# Patient Record
Sex: MALE
[De-identification: names, ages, dates, MRNs, and addresses within clinical notes are randomized per-mention and may not be internally consistent; named-entity substitution may affect disease eponyms.]

---

## 2019-06-08 ENCOUNTER — HOSPITAL ENCOUNTER (EMERGENCY)
Dept: HOSPITAL 72 - EMR | Age: 44
Discharge: HOME | End: 2019-06-08
Payer: MEDICAID

## 2019-06-08 VITALS — HEIGHT: 71 IN | BODY MASS INDEX: 34.3 KG/M2 | WEIGHT: 245 LBS

## 2019-06-08 VITALS — SYSTOLIC BLOOD PRESSURE: 149 MMHG | DIASTOLIC BLOOD PRESSURE: 91 MMHG

## 2019-06-08 DIAGNOSIS — K80.20: ICD-10-CM

## 2019-06-08 DIAGNOSIS — R10.11: Primary | ICD-10-CM

## 2019-06-08 LAB
ADD MANUAL DIFF: NO
ALBUMIN SERPL-MCNC: 4 G/DL (ref 3.4–5)
ALBUMIN/GLOB SERPL: 1.1 {RATIO} (ref 1–2.7)
ALP SERPL-CCNC: 110 U/L (ref 46–116)
ALT SERPL-CCNC: 53 U/L (ref 12–78)
ANION GAP SERPL CALC-SCNC: 9 MMOL/L (ref 5–15)
APPEARANCE UR: CLEAR
APTT PPP: (no result) S
AST SERPL-CCNC: 31 U/L (ref 15–37)
BASOPHILS NFR BLD AUTO: 1.5 % (ref 0–2)
BILIRUB SERPL-MCNC: 0.3 MG/DL (ref 0.2–1)
BUN SERPL-MCNC: 11 MG/DL (ref 7–18)
CALCIUM SERPL-MCNC: 9 MG/DL (ref 8.5–10.1)
CHLORIDE SERPL-SCNC: 104 MMOL/L (ref 98–107)
CO2 SERPL-SCNC: 27 MMOL/L (ref 21–32)
CREAT SERPL-MCNC: 1 MG/DL (ref 0.55–1.3)
EOSINOPHIL NFR BLD AUTO: 4.5 % (ref 0–3)
ERYTHROCYTE [DISTWIDTH] IN BLOOD BY AUTOMATED COUNT: 11 % (ref 11.6–14.8)
GLOBULIN SER-MCNC: 3.5 G/DL
GLUCOSE UR STRIP-MCNC: NEGATIVE MG/DL
HCT VFR BLD CALC: 43.8 % (ref 42–52)
HGB BLD-MCNC: 15.3 G/DL (ref 14.2–18)
KETONES UR QL STRIP: NEGATIVE
LEUKOCYTE ESTERASE UR QL STRIP: NEGATIVE
LYMPHOCYTES NFR BLD AUTO: 38.6 % (ref 20–45)
MCV RBC AUTO: 83 FL (ref 80–99)
MONOCYTES NFR BLD AUTO: 6.3 % (ref 1–10)
NEUTROPHILS NFR BLD AUTO: 49.1 % (ref 45–75)
NITRITE UR QL STRIP: NEGATIVE
PH UR STRIP: 5 [PH] (ref 4.5–8)
PLATELET # BLD: 263 K/UL (ref 150–450)
POTASSIUM SERPL-SCNC: 3.5 MMOL/L (ref 3.5–5.1)
PROT UR QL STRIP: NEGATIVE
RBC # BLD AUTO: 5.28 M/UL (ref 4.7–6.1)
SODIUM SERPL-SCNC: 140 MMOL/L (ref 136–145)
SP GR UR STRIP: 1.02 (ref 1–1.03)
UROBILINOGEN UR-MCNC: NORMAL MG/DL (ref 0–1)
WBC # BLD AUTO: 8.5 K/UL (ref 4.8–10.8)

## 2019-06-08 PROCEDURE — 99284 EMERGENCY DEPT VISIT MOD MDM: CPT

## 2019-06-08 PROCEDURE — 80053 COMPREHEN METABOLIC PANEL: CPT

## 2019-06-08 PROCEDURE — 83690 ASSAY OF LIPASE: CPT

## 2019-06-08 PROCEDURE — 96361 HYDRATE IV INFUSION ADD-ON: CPT

## 2019-06-08 PROCEDURE — 96375 TX/PRO/DX INJ NEW DRUG ADDON: CPT

## 2019-06-08 PROCEDURE — 81003 URINALYSIS AUTO W/O SCOPE: CPT

## 2019-06-08 PROCEDURE — 74176 CT ABD & PELVIS W/O CONTRAST: CPT

## 2019-06-08 PROCEDURE — 36415 COLL VENOUS BLD VENIPUNCTURE: CPT

## 2019-06-08 PROCEDURE — 96374 THER/PROPH/DIAG INJ IV PUSH: CPT

## 2019-06-08 PROCEDURE — 85025 COMPLETE CBC W/AUTO DIFF WBC: CPT

## 2019-06-08 NOTE — DIAGNOSTIC IMAGING REPORT
EXAM:

  CT Abdomen and Pelvis Without Intravenous Contrast

 

CLINICAL HISTORY:

  ABD PAIN

 

TECHNIQUE:

  Axial computed tomography images of the abdomen and pelvis without 

intravenous contrast.  CTDI is 19.66 mGy and DLP is 1137 mGy-cm.  One or 

more of the following dose reduction techniques were used: automated 

exposure control, adjustment of the mA and/or kV according to patient 

size, use of iterative reconstruction technique.

 

COMPARISON:

  No relevant prior studies available.

 

FINDINGS:

  Lung bases:  Unremarkable.  No mass.  No consolidation.

 

 ABDOMEN:

  Liver:  Unremarkable.

  Gallbladder and bile ducts:  Unremarkable.  No calcified stones.  No 

ductal dilation.

  Pancreas:  Unremarkable.  No ductal dilation.

  Spleen:  Unremarkable.  No splenomegaly.

  Adrenals:  Unremarkable.  No mass.

  Kidneys and ureters:  Unremarkable.  No calculi.  No hydronephrosis.

  Stomach and bowel:  Unremarkable.  No obstruction.  No mucosal 

thickening.

 

 PELVIS:

  Appendix:  No findings to suggest acute appendicitis.

  Bladder:  Mild bladder wall thickening is nonspecific but can be seen 

in cystitis.  No stones.

  Reproductive:  Unremarkable as visualized.

 

 ABDOMEN and PELVIS:

  Intraperitoneal space:  Unremarkable.  No free air.  No significant 

fluid collection.

  Bones/joints:  No acute fracture.

  Soft tissues:  Unremarkable.

  Vasculature:  Unremarkable.  No abdominal aortic aneurysm.

  Lymph nodes:  Unremarkable.  No enlarged lymph nodes.

 

IMPRESSION:     

1.  No renal calculi or hydronephrosis.

2.  Mild bladder wall thickening is nonspecific but can be seen in 

cystitis.

## 2019-06-08 NOTE — EMERGENCY ROOM REPORT
History of Present Illness


General


Chief Complaint:  Abdominal Pain


Source:  Patient





Present Illness


HPI


Is a 44-year-old male with no past medical history.  He presents with chief 

complaint of right upper quadrant pain.  It was intermittent for 3 days but 

constant today.  Pain is sharp.  Radiating to the back.  Has nausea but no 

vomiting.  No diarrhea.  Pain is 8 out of 10.  Not associated with food.  

Denies any other complaint.


Allergies:  


Coded Allergies:  


     No Known Allergies (Unverified , 6/8/19)





Patient History


Past Medical History:  see triage record, old chart reviewed


Past Surgical History:  none


Pertinent Family History:  none


Social History:  Denies: smoking


Immunizations:  other


Reviewed Nursing Documentation:  PMH: Agreed; PSxH: Agreed





Nursing Documentation-PMH


Past Medical History:  No Stated History





Review of Systems


Eye:  Denies: eye pain, blurred vision


ENT:  Denies: ear pain, nose congestion, throat swelling


Respiratory:  Denies: cough, shortness of breath


Cardiovascular:  Denies: chest pain, palpitations


Gastrointestinal:  Reports: abdominal pain, nausea; Denies: diarrhea, vomiting


Musculoskeletal:  Denies: back pain, joint pain


Skin:  Denies: rash


Neurological:  Denies: headache, numbness


Endocrine:  Denies: increased thirst, increased urine


Hematologic/Lymphatic:  Denies: easy bruising


All Other Systems:  negative except mentioned in HPI





Physical Exam





Vital Signs








  Date Time  Temp Pulse Resp B/P (MAP) Pulse Ox O2 Delivery O2 Flow Rate FiO2


 


6/8/19 20:42 97.5 86 15 149/91 (110) 97 Room Air  





vitals unremarkable


Sp02 EP Interpretation:  reviewed, normal


General Appearance:  well appearing, no apparent distress, alert


Head:  normocephalic, atraumatic, other - He has sutures over the glabella.


Eyes:  bilateral eye PERRL, bilateral eye EOMI


ENT:  hearing grossly normal, normal pharynx


Neck:  full range of motion, supple, no meningismus


Respiratory:  chest non-tender, lungs clear, normal breath sounds


Cardiovascular #1:  regular rate, rhythm, no murmur


Gastrointestinal:  normal bowel sounds, non tender, no mass, no organomegaly, 

no bruit, non-distended


Musculoskeletal:  back normal, gait/station normal, normal range of motion


Psychiatric:  mood/affect normal


Skin:  warm/dry





Procedures


Additional Procedure


Procedure Narrative


Procedure: Suture removal


Indication: Laceration repair


Description: Using a tweezer and a small scissor, I removed 5 sutures.  Patient 

tolerated procedure without a problem.  No complication.  No bleeding.





Medical Decision Making


Diagnostic Impression:  


 Primary Impression:  


 Abdominal pain


 Qualified Codes:  R10.11 - Right upper quadrant pain


 Additional Impressions:  


 Cholelithiasis


 Qualified Codes:  K80.20 - Calculus of gallbladder without cholecystitis 

without obstruction


 Encounter for removal of sutures


ER Course


Patient presents with right upper quadrant pain.  CT scan is unremarkable.  No 

gallstone seen on CT scan.  I did a bedside ultrasound that showed gallbladder 

sludge.  Negative Kimbrough sign.  No gallbladder wall thickening.  Normal common 

bowel duct.  No evidence of acute abdomen or obstruction.


Lab Results Impression


labs normal





Last Vital Signs








  Date Time  Temp Pulse Resp B/P (MAP) Pulse Ox O2 Delivery O2 Flow Rate FiO2


 


6/8/19 20:42 97.5 86 15 149/91 (110) 97 Room Air  








Status:  improved


Disposition:  HOME, SELF-CARE


Condition:  Stable


Scripts


Ibuprofen* (MOTRIN*) 600 Mg Tablet


600 MG ORAL THREE TIMES A DAY, #30 TAB 0 Refills


   Prov: Konrad Bender MD         6/8/19 


Hydrocodone/Acetaminophen 5-325* (HYDROCODONE/ACETAMINOPHEN 5-325*) 1 Each 

Tablet


1 TAB ORAL Q6H PRN for For Pain, #15 TAB 0 Refills


   Prov: Konrad Bender MD         6/8/19





Additional Instructions:  


Follow-up with your doctor in 2-3 days.  Return if symptom worsen.











Konrad Bender MD Jun 8, 2019 21:11

## 2019-11-04 ENCOUNTER — HOSPITAL ENCOUNTER (EMERGENCY)
Dept: HOSPITAL 72 - EMR | Age: 44
Discharge: HOME | End: 2019-11-04
Payer: COMMERCIAL

## 2019-11-04 VITALS — HEIGHT: 71 IN | WEIGHT: 245 LBS | BODY MASS INDEX: 34.3 KG/M2

## 2019-11-04 VITALS — SYSTOLIC BLOOD PRESSURE: 148 MMHG | DIASTOLIC BLOOD PRESSURE: 83 MMHG

## 2019-11-04 VITALS — DIASTOLIC BLOOD PRESSURE: 83 MMHG | SYSTOLIC BLOOD PRESSURE: 148 MMHG

## 2019-11-04 DIAGNOSIS — R10.9: ICD-10-CM

## 2019-11-04 DIAGNOSIS — H66.91: Primary | ICD-10-CM

## 2019-11-04 PROCEDURE — 99282 EMERGENCY DEPT VISIT SF MDM: CPT

## 2019-11-04 NOTE — EMERGENCY ROOM REPORT
History of Present Illness


General


Chief Complaint:  Pain


Source:  Patient





Present Illness


HPI


44-year-old with no symptom past medical history here complaining of 2 weeks of 

pain right ear.  Patient reports that he was given amoxicillin by his primary 

care 2 weeks ago however pain still there denying any pus drainage or mastoid 

tenderness.  Denies fever and chills, sore throat, fever and chills, chest 

pain.  Patient reports that he has had Bell's palsy in the past however has 

full range of motion of facial nerve and no signs of Bell's palsy or facial 

droop is noted.  Patient is up-to-date with all of his immunization.  Rating 

the pain 10 out of 10 without radiation.  Denies any tinnitus, vertigo, 

dizziness, hearing loss.  Also reports that he was seen at [facility] in 

June 2019 for right upper quadrant abdominal pain, CT scan was done and was 

negative.  Patient was then followed by primary care physician and was told 

this is musculoskeletal as it is worse when sitting down and standing and 

changing position.  Patient has not been taking medication for symptom relief.  

Denies any fall or injury, urinary symptoms, nausea vomiting, diarrhea or 

constipation.  Patient is sitting comfortably with stable vital signs.


Allergies:  


Coded Allergies:  


     No Known Allergies (Verified , 1/19/07)





Patient History


Past Medical History:  see triage record


Past Surgical History:  unable to obtain


Pertinent Family History:  none


Immunizations:  UTD


Reviewed Nursing Documentation:  PMH: Agreed; PSxH: Agreed





Nursing Documentation-PMH


Past Medical History:  No Stated History





Review of Systems


All Other Systems:  negative except mentioned in HPI





Physical Exam





Vital Signs








  Date Time  Temp Pulse Resp B/P (MAP) Pulse Ox O2 Delivery O2 Flow Rate FiO2


 


11/4/19 15:01 98.4 78 17 148/83 (104) 95 Room Air  








Sp02 EP Interpretation:  reviewed, normal


General Appearance:  no apparent distress, alert, GCS 15, non-toxic


Head:  normocephalic, atraumatic


Eyes:  bilateral eye normal inspection, bilateral eye PERRL


ENT:  hearing grossly normal, normal pharynx, no angioedema, normal voice, 

uvula midline, moist mucus membranes, other - TM bulging in right ear, no 

mastoid tenderness noted


Neck:  full range of motion, supple/symm/no masses


Respiratory:  chest non-tender, lungs clear, normal breath sounds, no rhonchi, 

no wheezing, speaking full sentences


Cardiovascular #1:  regular rate, rhythm, no edema, no murmur


Gastrointestinal:  normal bowel sounds, non tender, soft, no mass, no 

organomegaly, no peritonitis, no bruit, non-distended, no guarding, no hernia, 

no pulsatile mass, no rebound


Rectal:  deferred


Genitourinary:  no CVA tenderness


Musculoskeletal:  back normal, gait/station normal, normal range of motion, non-

tender, no calf tenderness, pelvis stable


Neurologic:  alert, oriented x3, responsive, motor strength/tone normal, 

sensory intact, speech normal


Psychiatric:  judgement/insight normal, memory normal, mood/affect normal, no 

suicidal/homicidal ideation


Skin:  no rash


Lymphatic:  no adenopathy





Medical Decision Making


PA Attestation


All my diagnosis and treatment plans were reviewed ad discussed with my 

supervising physician Dr. Marin


Diagnostic Impression:  


 Primary Impression:  


 Otitis media


 Additional Impression:  


 Abdominal wall pain


ER Course


44-year-old with no symptom past medical history here complaining of 2 weeks of 

pain right ear.  Patient reports that he was given amoxicillin by his primary 

care 2 weeks ago however pain still there denying any pus drainage or mastoid 

tenderness.  Denies fever and chills, sore throat, fever and chills, chest 

pain.  Patient reports that he has had Bell's palsy in the past however has 

full range of motion of facial nerve and no signs of Bell's palsy or facial 

droop is noted.  Patient is up-to-date with all of his immunization.  Rating 

the pain 10 out of 10 without radiation.  Denies any tinnitus, vertigo, 

dizziness, hearing loss.  Also reports that he was seen at [facility] back in 

June 2019 for right upper quadrant abdominal pain, CT scan was done and was 

negative.  Patient was then followed by primary care physician and was told 

this is musculoskeletal as it is worse when sitting down and standing and 

changing position.  Patient has not been taking medication for symptom relief.  

Denies any fall or injury, urinary symptoms, nausea vomiting, diarrhea or 

constipation.  Patient is sitting comfortably with stable vital signs.





Ddx considered but are not limited to: Otitis media, otitis externa, mastoiditis

, benign positional vertigo, cholecystitis, appendicitis, liver cirrhosis, 

fatty liver, abdominal wall musculoskeletal pain











Vital signs: are WNL, pt. is afebrile








 H&PE are most consistent with: Otitis media right ear, abdominal wall 

musculoskeletal pain














ORDERS: No further imaging of the abdomen chronic pain and no new symptoms.  

Patient to follow-up with primary care physician referral to gastroenterologist 

for further assessment.  Also patient to follow-up with primary care for 

referral to ear nose throat doctor.  I ordered Ceftin ear as well as Motrin, 

lidocaine patches and Robaxin.








ED INTERVENTIONS: None required at this time.























DISCHARGE: At this time pt. is stable for d/c to home. Will provide printed 

patient care instructions, and any necessary prescriptions. Care plan and 

follow up instructions have been discussed with the patient prior to discharge.





Last Vital Signs








  Date Time  Temp Pulse Resp B/P (MAP) Pulse Ox O2 Delivery O2 Flow Rate FiO2


 


11/4/19 15:06 98.4 67 17 148/83 95 Room Air  








Disposition:  HOME, SELF-CARE


Condition:  Stable


Scripts


Cefdinir (CEFDINIR) 300 Mg Capsule


300 MG PO BID for 10 Days, #20 CAP


   Prov: Sudheer Veloz         11/4/19 


Ibuprofen* (MOTRIN*) 600 Mg Tablet


600 MG ORAL Q6H PRN for For Pain, #30 TAB


   Prov: Sudheer Veloz         11/4/19 


Methocarbamol* (ROBAXIN-500*) 500 Mg Tablet


500 MG ORAL TID PRN for For Pain, #15 TAB 0 Refills


   Prov: Sudheer Veloz         11/4/19


Patient Instructions:  Abdominal Pain, Adult, Otitis Media, Adult





Additional Instructions:  


Take medication as directed, follow with your primary care provider for 

referral to ear nose throat doctor as well as possible referral to 

gastroenterologist.  You had a CT scan of the abdomen at [facility] few months 

ago which was normal you have already been seen colitis and due to certain 

movements it is musculoskeletal and is becoming  chronic and needs to be 

further evaluated by her primary care physician with proper referral to 

specialists











Sudheer Veloz Nov 4, 2019 15:20